# Patient Record
(demographics unavailable — no encounter records)

---

## 2018-04-18 NOTE — NUR
Patient ambulated to ER with steady gait, reports she has pain on the knees, 
was trying to feel better so she can sleep, took several pills: tylenol w/ 
codeine, xanax, meloxicam, and oxycodone about 2 hours ago and was driven to ER 
by friend, took some Narcan on the way to ER, c/o drug overdose. Patient 
restless, reports feeling weird and anxious, denies suicidal ideation.

## 2018-04-18 NOTE — NUR
Notified Fadi Lewis [facility] for psych evaluation. ETA in 1 hour ~5120.

## 2018-04-18 NOTE — NUR
Patient discharged to home in stable conditon.  Written and verbal after care 
instructions given. 

Patient verbalizes understanding of instructions.pt walks in steady gait. 
friend came and  the pt.